# Patient Record
Sex: MALE | Race: WHITE | Employment: STUDENT | URBAN - METROPOLITAN AREA
[De-identification: names, ages, dates, MRNs, and addresses within clinical notes are randomized per-mention and may not be internally consistent; named-entity substitution may affect disease eponyms.]

---
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
## 2021-01-01 NOTE — ED PROVIDER NOTES
History     Chief Complaint   Patient presents with     Otalgia     left ear pain.      HPI    SUBJECTIVE: Shravan Cardoza  is here today because of:Ear Pain  The patient has had symptoms of earache and dried blood in ear for two days and headache.   Onset of symptoms was 4 days ago. Course of illness is worsening.  Patient denies exposure to illness at home or work/school.   Treatment measures tried include cefdinir for 2 days as patient saw provider 2 days ago.  Due to bloody drainage noted in the ear canal over the past 2 days it was recommended to return for reevaluation..  Patient is not exposed to tobacco  Patient denies fever, aches, chills, sweats, eye pain, throat pain, chest pain, cough, wheezing, shortness of breath, abdominal pain, nausea, vomiting, diarrhea,  hematuria, dysuria, speech difficulty, left or right-sided body weakness, mental confusion, thoughts of harming self.  Patient reports feeling well otherwise    Allergies:  No Known Allergies    Problem List:    There are no active problems to display for this patient.       Past Medical History:    No past medical history on file.    Past Surgical History:    No past surgical history on file.    Family History:    Family History   Problem Relation Age of Onset     Diabetes Maternal Grandmother      Hypertension Maternal Grandmother      Cancer Maternal Grandfather         stomach      Hypertension Paternal Grandmother      Cancer - colorectal Other         GMGF        Social History:  Marital Status:  Single [1]  Social History     Tobacco Use     Smoking status: Never Smoker     Smokeless tobacco: Never Used     Tobacco comment: outside, q other weekend   Substance Use Topics     Alcohol use: Never     Frequency: Never     Drug use: Never        Medications:         ciprofloxacin-dexamethasone (CIPRODEX) 0.3-0.1 % otic suspension       cefdinir (OMNICEF) 300 MG capsule       cetirizine (ZYRTEC) 10 MG tablet      Review of Systems  As mentioned  above in the history present illness. All other systems were reviewed and are negative.    Physical Exam   BP: (!) 146/83  Pulse: 68  Temp: 98.9  F (37.2  C)  Resp: 16  Weight: 77.1 kg (170 lb)  SpO2: 100 %      Physical Exam  GENERAL: alert, no acute distress and no apparent distress  EYES:  Right conjunctiva is not injected and without discharge.  Left conjunctiva is not injected and without discharge.  EARS: Right TM is normal: no effusions, no erythema, and normal landmarks.  Left TM is bloody drainage and purulent drainage noted in the ear canal and only one third of the TM is visualized and suspect TM rupture.  NOSE: Nasal mucosa is normal.  Sinus not tender.  NECK: supple with no adenopathy  CARDIAC:NORMAL - regular rate and rhythm without murmur.  RESP: Normal - CTA without rales, rhonchi, or wheezing.  SKIN: normal    ED Course        Procedures    No results found for this or any previous visit (from the past 24 hour(s)).    Medications - No data to display    Assessments & Plan (with Medical Decision Making)     I have reviewed the nursing notes.    I have reviewed the findings, diagnosis, plan and need for follow up with the patient.  Medical Decision Making:  CXR is not indicated.  Rapid Strep test is not indicated.     Assessment:  1) Acute left otitis media ruptured.    PLAN:  ciprodex 4 drops left ear bid for 7 days  Continue cefdinir bid for 6 more days  Use acetaminophen, ibuprofen, increase fluids and rest.   Follow up with any questions or problems    New Prescriptions    CIPROFLOXACIN-DEXAMETHASONE (CIPRODEX) 0.3-0.1 % OTIC SUSPENSION    Place 4 drops Into the left ear 2 times daily for 7 days       Final diagnoses:   Acute suppur left otitis media w/spontan rupture of tympanic membrane       12/31/2020   [facility] EMERGENCY DEPT     Lee, Maura Castillo, RAYMUNDO CNP  12/31/20 1915

## 2021-01-01 NOTE — ED TRIAGE NOTES
Left ear pain. Used tylenol and ibuprofen today without relief. Due to fly on Friday. Prescribed abx 3 days ago without improvement.

## 2021-04-09 ENCOUNTER — OFFICE VISIT (OUTPATIENT)
Dept: FAMILY MEDICINE | Facility: CLINIC | Age: 21
End: 2021-04-09
Payer: COMMERCIAL

## 2021-04-09 VITALS
WEIGHT: 170 LBS | OXYGEN SATURATION: 100 % | HEART RATE: 64 BPM | TEMPERATURE: 97.1 F | BODY MASS INDEX: 24.34 KG/M2 | RESPIRATION RATE: 14 BRPM | HEIGHT: 70 IN | DIASTOLIC BLOOD PRESSURE: 78 MMHG | SYSTOLIC BLOOD PRESSURE: 120 MMHG

## 2021-04-09 DIAGNOSIS — J34.2 DEVIATED SEPTUM: Primary | ICD-10-CM

## 2021-04-09 DIAGNOSIS — S09.92XA INJURY TO NOSE, INITIAL ENCOUNTER: ICD-10-CM

## 2021-04-09 PROCEDURE — 99213 OFFICE O/P EST LOW 20 MIN: CPT | Performed by: NURSE PRACTITIONER

## 2021-04-09 ASSESSMENT — MIFFLIN-ST. JEOR: SCORE: 1796.11

## 2021-04-09 NOTE — PATIENT INSTRUCTIONS
1.  Use ice for swelling as needed.  2.  Ibuprofen for pain.  3.  Follow-up in 2 weeks with ENT if any breathing symptoms with swelling improved.    Patient Education     Nose Fracture, No X-Ray    Based on your exam today, you have a broken bone, or fracture, of the nose. Some fractures are obvious and an X-ray is not needed to make the diagnosis. A nose fracture will cause pain, swelling, and nasal stuffiness. Sometimes, there is also bleeding from the nose. It is common to get bruising around your eyes by the next day.  If you have a major break, parts of your nose bone are pushed out of place. A major break that changes the shape of your nose must be treated by a nose specialist, called an ENT (ear, nose, and throat) doctor. The ENT doctor will straighten the bones in your nose. This is called a reduction. Some fractures may need a reduction as soon as possible, such as when bleeding from the nose won t stop. Otherwise, it is best to wait a few days until the swelling has gone down. Then the doctor can better see when your nose is back in the right position. You may have an X-ray before and after the procedure.  Home care    You will likely be prescribed medicine for pain. Take this medicine as directed.    Apply an ice pack every 1 to 2 hours for the first day. Continue with ice packs 3 to 4 times a day for the next 2days, then as needed to ease pain and swelling. To make an ice pack, put ice cubes in a plastic bag that seals at the top. Wrap the bag in a clean, thin towel or cloth. Never put ice or an ice pack directly on the skin.    Tell your healthcare provider if you are taking aspirin or blood-thinning medicines. These medicines make it more likely that your nose will bleed. Your provider may need to change your dose.    Don t drink alcohol or hot liquids for the next 2 days. Alcohol or hot liquids in your mouth can dilate blood vessels in your nose and cause bleeding.    Don t blow your nose for the  first 2 days. Then, do so gently so you don t cause bleeding.    Don t play contact sports until approved by your provider, unless you can protect your nose from getting injured again. You can wear a special custom-fitted plastic face mask to protect your nose.  Special note on concussions  If you had any symptoms of a concussion today, don t return to sports or any activity that could result in another head injury.  These are symptoms of a concussion:    Nausea    Vomiting    Dizziness    Confusion    Headache    Memory loss    Loss of consciousness  Wait until all of your symptoms are gone and your provider says it s OK to resume your activity. Having a second head injury before you fully recover from the first one can lead to serious brain injury.  Follow-up care  Follow up with your healthcare provider, or as advised. If your nose looks crooked or if you continue to have trouble breathing through one or both sides of your nose after the swelling goes down, call the ENT doctor. If you have trouble getting an ENT appointment, call your regular healthcare provider or return here.  When to seek medical advice  Call your healthcare provider right away if any of the following occur:    Bleeding from the nose that is not controlled by pinching the nostrils together for 15 minutes    Increasing facial swelling, pain, or redness    Fever of 100.4 F (38 C) or higher, or as directed by your healthcare provider    Chills    Unable to breathe from both sides of the nose after swelling goes down    Sinus pain  Call 911  Call 911 if you have:    Repeated vomiting    Headache or dizziness that is severe or gets worse    Abnormal drowsiness, or unable to awaken    Confusion or change in behavior or speech    Convulsion or seizure  Xi last reviewed this educational content on 6/1/2018 2000-2021 [employer]. All rights reserved. This information is not intended as a substitute for professional medical care.  Always follow your healthcare professional's instructions.

## 2021-04-09 NOTE — PROGRESS NOTES
Assessment & Plan     Deviated septum  Patient has mild deviation of the septum right to the left just below the bridge of th nose with some swelling in the right nare and at the bridge of nose on the right.  Patient is not in a contact sport currently and only pole jumping.  Recommend being careful for another injury to this area for a couple weeks.  At this time the swelling does impede mildly in the nose due to swelling.  Referral placed to ENT if symptoms of breathing through the nose do not improve for evaluation.  - OTOLARYNGOLOGY REFERRAL    Injury to nose, initial encounter  - OTOLARYNGOLOGY REFERRAL    See Patient Instructions    Return in about 2 weeks (around 4/23/2021), or if symptoms worsen or fail to improve.    Pastora Skaggs NP  [facility] MAGDY Cheney is a 20 year old who presents for the following health issues     HPI     Concern - Facial injury; concerned that his nose may be broken  Onset: 2 weeks ago  Description: Was pole vaulting, his pole broke and hit him in the face.   Intensity: mild  Progression of Symptoms:  Worsening; swelling has been going down  Accompanying Signs & Symptoms: headaches   Previous history of similar problem: none  Precipitating factors:        Worsened by: touching it brings pain   Alleviating factors:        Improved by: tylenol  Therapies tried and outcome: tylenol    Review of Systems   CONSTITUTIONAL: NEGATIVE for fever, chills, change in weight  EYES: NEGATIVE for vision changes or irritation  ENT/MOUTH: POSITIVE for vertigo  RESP: NEGATIVE for significant cough or SOB  CV: NEGATIVE for chest pain, palpitations or peripheral edema  NEURO: POSITIVE for injury to mid nose with swelling in the right upper bridge  PSYCHIATRIC: NEGATIVE for changes in mood or affect  ROS otherwise negative      Objective    /78 (BP Location: Left arm, Patient Position: Sitting, Cuff Size: Adult Regular)   Pulse 64   Temp 97.1  F (36.2  " C) (Tympanic)   Resp 14   Ht 1.784 m (5' 10.24\")   Wt 77.1 kg (170 lb)   SpO2 100%   BMI 24.23 kg/m    Body mass index is 24.23 kg/m .  Physical Exam   GENERAL: healthy, alert and no distress  EYES: Eyes grossly normal to inspection, PERRL and conjunctivae and sclerae normal  HENT: normal cephalic/atraumatic and mild deviation from the right to the left with moderate swelling up in right nare and swelling on right upper bridge of the nose  RESP: lungs clear to auscultation - no rales, rhonchi or wheezes  CV: regular rate and rhythm, normal S1 S2, no S3 or S4, no murmur, click or rub, no peripheral edema and peripheral pulses strong  NEURO: Normal strength and tone, mentation intact and speech normal  PSYCH: mentation appears normal, affect normal/bright      "

## 2021-04-11 ENCOUNTER — HEALTH MAINTENANCE LETTER (OUTPATIENT)
Age: 21
End: 2021-04-11

## 2021-05-25 ENCOUNTER — HOSPITAL ENCOUNTER (EMERGENCY)
Facility: CLINIC | Age: 21
Discharge: HOME | End: 2021-05-26
Attending: EMERGENCY MEDICINE | Admitting: EMERGENCY MEDICINE
Payer: COMMERCIAL

## 2021-05-25 ENCOUNTER — APPOINTMENT (OUTPATIENT)
Dept: GENERAL RADIOLOGY | Facility: CLINIC | Age: 21
End: 2021-05-25
Attending: EMERGENCY MEDICINE
Payer: COMMERCIAL

## 2021-05-25 DIAGNOSIS — R40.4 UNRESPONSIVE EPISODE: ICD-10-CM

## 2021-05-25 PROCEDURE — 85025 COMPLETE CBC W/AUTO DIFF WBC: CPT | Performed by: EMERGENCY MEDICINE

## 2021-05-25 PROCEDURE — 93010 ELECTROCARDIOGRAM REPORT: CPT | Performed by: EMERGENCY MEDICINE

## 2021-05-25 PROCEDURE — 80048 BASIC METABOLIC PNL TOTAL CA: CPT | Performed by: EMERGENCY MEDICINE

## 2021-05-25 PROCEDURE — 99285 EMERGENCY DEPT VISIT HI MDM: CPT | Mod: 25 | Performed by: EMERGENCY MEDICINE

## 2021-05-25 PROCEDURE — 71046 X-RAY EXAM CHEST 2 VIEWS: CPT

## 2021-05-25 PROCEDURE — 93005 ELECTROCARDIOGRAM TRACING: CPT | Performed by: EMERGENCY MEDICINE

## 2021-05-25 ASSESSMENT — ENCOUNTER SYMPTOMS
ENDOCRINE NEGATIVE: 1
MUSCULOSKELETAL NEGATIVE: 1
ALLERGIC/IMMUNOLOGIC NEGATIVE: 1
PSYCHIATRIC NEGATIVE: 1
EYES NEGATIVE: 1
CARDIOVASCULAR NEGATIVE: 1
NEUROLOGICAL NEGATIVE: 1
GASTROINTESTINAL NEGATIVE: 1
HEMATOLOGIC/LYMPHATIC NEGATIVE: 1
RESPIRATORY NEGATIVE: 1

## 2021-05-25 ASSESSMENT — MIFFLIN-ST. JEOR: SCORE: 1808.24

## 2021-05-26 VITALS
TEMPERATURE: 97.9 F | DIASTOLIC BLOOD PRESSURE: 79 MMHG | OXYGEN SATURATION: 98 % | BODY MASS INDEX: 23.8 KG/M2 | WEIGHT: 170 LBS | SYSTOLIC BLOOD PRESSURE: 134 MMHG | HEART RATE: 70 BPM | RESPIRATION RATE: 16 BRPM | HEIGHT: 71 IN

## 2021-05-26 LAB
ANION GAP SERPL CALCULATED.3IONS-SCNC: 7 MMOL/L (ref 3–14)
BASOPHILS # BLD AUTO: 0.1 10E9/L (ref 0–0.2)
BASOPHILS NFR BLD AUTO: 0.9 %
BUN SERPL-MCNC: 17 MG/DL (ref 7–30)
CALCIUM SERPL-MCNC: 9 MG/DL (ref 8.5–10.1)
CHLORIDE SERPL-SCNC: 107 MMOL/L (ref 98–110)
CO2 SERPL-SCNC: 28 MMOL/L (ref 20–32)
CREAT SERPL-MCNC: 1.12 MG/DL (ref 0.5–1)
DIFFERENTIAL METHOD BLD: NORMAL
EOSINOPHIL # BLD AUTO: 0.5 10E9/L (ref 0–0.7)
EOSINOPHIL NFR BLD AUTO: 6.1 %
ERYTHROCYTE [DISTWIDTH] IN BLOOD BY AUTOMATED COUNT: 11.7 % (ref 10–15)
GFR SERPL CREATININE-BSD FRML MDRD: >90 ML/MIN/{1.73_M2}
GLUCOSE SERPL-MCNC: 99 MG/DL (ref 70–99)
HCT VFR BLD AUTO: 42.4 % (ref 40–53)
HGB BLD-MCNC: 13.7 G/DL (ref 13.3–17.7)
IMM GRANULOCYTES # BLD: 0 10E9/L (ref 0–0.4)
IMM GRANULOCYTES NFR BLD: 0.3 %
LYMPHOCYTES # BLD AUTO: 3.5 10E9/L (ref 0.8–5.3)
LYMPHOCYTES NFR BLD AUTO: 47.2 %
MCH RBC QN AUTO: 30.4 PG (ref 26.5–33)
MCHC RBC AUTO-ENTMCNC: 32.3 G/DL (ref 31.5–36.5)
MCV RBC AUTO: 94 FL (ref 78–100)
MONOCYTES # BLD AUTO: 0.5 10E9/L (ref 0–1.3)
MONOCYTES NFR BLD AUTO: 6 %
NEUTROPHILS # BLD AUTO: 3 10E9/L (ref 1.6–8.3)
NEUTROPHILS NFR BLD AUTO: 39.5 %
NRBC # BLD AUTO: 0 10*3/UL
NRBC BLD AUTO-RTO: 0 /100
PLATELET # BLD AUTO: 210 10E9/L (ref 150–450)
POTASSIUM SERPL-SCNC: 4.1 MMOL/L (ref 3.4–5.3)
RBC # BLD AUTO: 4.51 10E12/L (ref 4.4–5.9)
SODIUM SERPL-SCNC: 142 MMOL/L (ref 133–144)
WBC # BLD AUTO: 7.5 10E9/L (ref 4–11)

## 2021-05-26 NOTE — DISCHARGE INSTRUCTIONS
1) Sony's evaluation today did not result in an emergency diagnosis.  The cause of his episode of losing consciousness and going unresponsive with a breath-holding was feeling earlier in the evening is unclear.  We have discussed various possibilities including seizure versus a paroxysmal transient arrhythmia.    2) A primary care clinic referral was placed to help with establishing primary care including discussion about follow-up work-up and testing including an event or Holter monitor and further referral to neurology for EEG assessment if there is high concern for seizure.    3) With the episode occurring while breath-holding while swimming we discussed that it may be helpful to avoid swimming for the next 1 to 2 weeks pending further evaluation of follow-up care. If an episode of unresponsiveness loss of conscious reoccurs without exertion or swimming or activity he may need to return for reevaluation.
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
## 2021-05-29 ENCOUNTER — RECORDS - HEALTHEAST (OUTPATIENT)
Dept: ADMINISTRATIVE | Facility: CLINIC | Age: 21
End: 2021-05-29

## 2021-06-01 ENCOUNTER — OFFICE VISIT (OUTPATIENT)
Dept: FAMILY MEDICINE | Facility: CLINIC | Age: 21
End: 2021-06-01
Payer: COMMERCIAL

## 2021-06-01 VITALS
HEART RATE: 57 BPM | SYSTOLIC BLOOD PRESSURE: 128 MMHG | WEIGHT: 170.4 LBS | DIASTOLIC BLOOD PRESSURE: 70 MMHG | RESPIRATION RATE: 16 BRPM | TEMPERATURE: 97.6 F | OXYGEN SATURATION: 98 % | BODY MASS INDEX: 23.77 KG/M2

## 2021-06-01 DIAGNOSIS — R40.4 UNRESPONSIVE EPISODE: ICD-10-CM

## 2021-06-01 PROCEDURE — 99213 OFFICE O/P EST LOW 20 MIN: CPT | Performed by: FAMILY MEDICINE

## 2021-06-01 RX ORDER — LORATADINE 10 MG/1
10 TABLET ORAL DAILY
COMMUNITY
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
## 2021-06-01 NOTE — PATIENT INSTRUCTIONS
Patient Education     Fainting: Uncertain Cause  Fainting (syncope) is a temporary loss of consciousness. It's often associated with a loss of postural tone. It s also called passing out. It occurs when blood flow to the brain is less than normal. There are other causes of fainting, too. Near-fainting (near-syncope) is very similar to fainting, but you don t fully pass out.   Most commonly, fainting is for reasons that aren't necessarily serious or life-threatening, although you may still get injured. Common triggers of less serious types of fainting include:     Sudden fear    Pain    Nausea    Emotional stress    Overexertion  Suddenly standing up after sitting or lying for a long time can also cause fainting.   More serious causes of fainting include:     Very slow or very fast heartbeat (arrhythmia)    Other types of heart disease, such as heart valve disease or coronary artery disease    Dehydration    Loss of blood    Seizure    Stroke    Ruptured blood vessel in the brain  Taking too much high blood pressure medicine can also cause low blood pressure and fainting.   Your healthcare provider may be able to tell why you are fainting by reviewing your health history and hearing about your fainting episodes. If the cause of your fainting remains unknown or if your healthcare provider is concerned about a more serious cause he or she may determine that you need further testing. Testing may include:     Echocardiogram. This will take ultrasound pictures of your heart to evaluate the heart's structure and function    Stress test. This will check for abnormalities with you heart function or heart rhythm with exercise    Tilt table test. This evaluates for changes in blood pressure or heart rate when going from a laying position to standing    Heart monitoring. This will evaluate for heart rhythms that are too slow or too fast that may be the cause of your fainting    Lab tests. This can check for abnormalities in  electrolytes, blood counts and other things  Home care  Follow these guidelines when caring for yourself at home:     Rest today. You may go back to your normal activities when you are feeling back to normal. It's best to stay with someone who can check on you for the next 24 hours to watch for another episode of fainting.    If you become lightheaded or dizzy, lie down right away and try to prop your feet above the level of your head. Or sit with your head between your knees.    Because the provider doesn t know the exact cause of your fainting or near-fainting spell, it s possible for you to have another spell without warning. Because of this, don t drive a car or operate dangerous equipment until your healthcare provider says it's OK to do so. Don t take a bath alone. Use a shower instead. Don t swim alone until your healthcare provider says that you are no longer in danger of having another fainting spell.  Follow-up care  Follow up with your healthcare provider, or as advised.   When to seek medical care  Call your healthcare provider right away if any of these occur:     Another fainting spell that s not explained by the common causes listed above    Pain in your chest, arm, neck, jaw, back, or abdomen    Shortness of breath    Severe headache or seizure    Blood in vomit or stools (black or red color)    Unexpected vaginal bleeding    Your heart beats very rapidly, very slowly, or irregularly (palpitations)    Also call your provider if you have signs of stroke:    Weakness in an arm or leg or on one side of the face    Trouble speaking or seeing    Extreme drowsiness, confusion, dizziness, or fainting  [employer] last reviewed this educational content on 6/1/2019 2000-2021 [employer]. All rights reserved. This information is not intended as a substitute for professional medical care. Always follow your healthcare professional's instructions.

## 2021-09-26 ENCOUNTER — HEALTH MAINTENANCE LETTER (OUTPATIENT)
Age: 21
End: 2021-09-26

## 2021-12-16 ENCOUNTER — VIRTUAL VISIT (OUTPATIENT)
Dept: FAMILY MEDICINE | Facility: CLINIC | Age: 21
End: 2021-12-16
Payer: COMMERCIAL

## 2021-12-16 ENCOUNTER — TELEPHONE (OUTPATIENT)
Dept: FAMILY MEDICINE | Facility: CLINIC | Age: 21
End: 2021-12-16

## 2021-12-16 DIAGNOSIS — M54.50 ACUTE BILATERAL LOW BACK PAIN WITHOUT SCIATICA: Primary | ICD-10-CM

## 2021-12-16 DIAGNOSIS — M54.50 BILATERAL LOW BACK PAIN WITHOUT SCIATICA, UNSPECIFIED CHRONICITY: Primary | ICD-10-CM

## 2021-12-16 DIAGNOSIS — M02.30 REITER'S DISEASE (H): ICD-10-CM

## 2021-12-16 PROCEDURE — 99213 OFFICE O/P EST LOW 20 MIN: CPT | Mod: TEL | Performed by: FAMILY MEDICINE

## 2021-12-16 RX ORDER — METHYLPREDNISOLONE 4 MG
TABLET, DOSE PACK ORAL
Qty: 21 TABLET | Refills: 0 | Status: SHIPPED | OUTPATIENT
Start: 2021-12-16

## 2021-12-16 RX ORDER — IBUPROFEN 200 MG
200 TABLET ORAL EVERY 4 HOURS PRN
COMMUNITY

## 2021-12-16 RX ORDER — CYCLOBENZAPRINE HCL 5 MG
5 TABLET ORAL 3 TIMES DAILY PRN
Qty: 30 TABLET | Refills: 0 | Status: SHIPPED | OUTPATIENT
Start: 2021-12-16

## 2021-12-16 ASSESSMENT — ENCOUNTER SYMPTOMS
NEUROLOGICAL NEGATIVE: 1
CONSTITUTIONAL NEGATIVE: 1
PSYCHIATRIC NEGATIVE: 1
BACK PAIN: 1
EYES NEGATIVE: 1
RESPIRATORY NEGATIVE: 1
ALLERGIC/IMMUNOLOGIC NEGATIVE: 1
ENDOCRINE NEGATIVE: 1
HEMATOLOGIC/LYMPHATIC NEGATIVE: 1
CARDIOVASCULAR NEGATIVE: 1
GASTROINTESTINAL NEGATIVE: 1

## 2021-12-16 NOTE — TELEPHONE ENCOUNTER
Reason for Call:  Other Referral    Detailed comments: Patient mom is calling requesting for a referral for [facility] for patients back pain. Mom stated patient went to [facility] yesterday for his back pain and insurance wont cover the bill because it is out of network unless provider will send a referral. Mom would like the referral in patient's [employer]hart account. Please contact mom when referral is placed.    Phone Number Patient can be reached at: Home number on file [phone] (home)    Best Time: any    Can we leave a detailed message on this number? YES    Call taken on 12/16/2021 at 2:17 PM by Laurel Andrew

## 2021-12-16 NOTE — PROGRESS NOTES
Shravan is a 21 year old who is being evaluated via a billable telephone visit.      What phone number would you like to be contacted at? [phone]  How would you like to obtain your AVS? 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      FUTURE APPOINTMENTS:       - Follow-up visit in 1 week or sooner as needed    Return in about 1 week (around 12/23/2021), or if symptoms worsen or fail to improve, for Follow up.    Helder Hurst MD  [facility]   Shravan is a 21 year old who presents for the following health issues     HPI 21 yr old male here for back pain. Started about two weeks ago. He was listing weights and while squatting heard a pop in his back. No radiation into his legs. He reports that pain is sharp in nature and gets worse with activity. Denies any recent injuries to the back.     Back Pain     Duration: about a day ago        Specific cause: lifting and squatting while working out - felt a pop in his back and has a lot of a pain - just resting it is 1/10 and moving or twisting it is 8/10    Description:   Location of pain: low back right in the middle on the bridgette  Character of pain: sharp  Pain radiation:radiates into the left leg  New numbness or weakness in legs, not attributed to pain:  YES tingling and numbness down left leg    Intensity: Currently 1/10, At its worst 8/10    History:   Pain interferes with job/activities: YES  History of back problems: no prior back problems  Any previous MRI or X-rays: None  Sees a specialist for back pain:  No  Therapies tried without relief: heat    Alleviating factors:   Improved by: cold, ibuprofen and rest       Precipitating factors:  Worsened by: Lifting, Bending, Standing, Walking and Coughing          Accompanying Signs & Symptoms:  Risk of Fracture:  None  Risk of Cauda Equina:  None  Risk of Infection:  None  Risk of Cancer:  None  Risk of Ankylosing Spondylitis:  Onset at age <35, male, AND morning back stiffness. no                       Review of Systems   Constitutional: Negative.    HENT: Negative.    Eyes: Negative.    Respiratory: Negative.    Cardiovascular: Negative.    Gastrointestinal: Negative.    Endocrine: Negative.    Genitourinary: Negative.    Musculoskeletal: Positive for back pain.   Skin: Negative.    Allergic/Immunologic: Negative.    Neurological: Negative.    Hematological: Negative.    Psychiatric/Behavioral: Negative.             Objective           Vitals:  No vitals were obtained today due to virtual visit.    Physical Exam   healthy, alert and no distress  PSYCH: Alert and oriented times 3; coherent speech, normal   rate and volume, able to articulate logical thoughts, able   to abstract reason, no tangential thoughts, no hallucinations   or delusions  His affect is normal  RESP: No cough, no audible wheezing, able to talk in full sentences  Remainder of exam unable to be completed due to telephone visits            Phone call duration: 5 minutes

## 2021-12-21 NOTE — TELEPHONE ENCOUNTER
I called Mom back.  No consent on file so can only take information.   Cannot share information.     Mom explains that pt was seen at [facility] after he hurt his back a couple weeks ago while weight lifting.    She says that he has since followed up with Dr Hurst.    However, pt was seen out of network when seen at [facility].  Mom was informed that a referral is needed.    Routed to Dr Hurst for consideration of a referral.  If referral written, then will need to notify referral specialist too.    Ashlyn Draper RN

## 2021-12-23 NOTE — TELEPHONE ENCOUNTER
Patient notified   Routed to referral spec.  Patient requested cancellation of 12/29 appt to sports med.     Emily Jason RN on 12/23/2021 at 11:22 AM

## 2021-12-30 NOTE — TELEPHONE ENCOUNTER
I cannot do this referral request even though a referral was enter in [facility]. Patient has a managed care insurance and [facility] is his network. We can provide service within [facility] or [facility] new policy is if we can provide service we cannot do an out of network referral.    Marisol TERRY-referral rep

## 2022-05-07 ENCOUNTER — HEALTH MAINTENANCE LETTER (OUTPATIENT)
Age: 22
End: 2022-05-07

## 2023-04-13 ENCOUNTER — TRANSFERRED RECORDS (OUTPATIENT)
Dept: HEALTH INFORMATION MANAGEMENT | Facility: CLINIC | Age: 23
End: 2023-04-13
Payer: COMMERCIAL

## 2023-04-23 ENCOUNTER — HEALTH MAINTENANCE LETTER (OUTPATIENT)
Age: 23
End: 2023-04-23

## 2023-05-25 ENCOUNTER — TRANSFERRED RECORDS (OUTPATIENT)
Dept: HEALTH INFORMATION MANAGEMENT | Facility: CLINIC | Age: 23
End: 2023-05-25
Payer: COMMERCIAL

## 2023-06-02 ENCOUNTER — HEALTH MAINTENANCE LETTER (OUTPATIENT)
Age: 23
End: 2023-06-02

## 2024-06-29 ENCOUNTER — HEALTH MAINTENANCE LETTER (OUTPATIENT)
Age: 24
End: 2024-06-29